# Patient Record
Sex: FEMALE | Race: WHITE | NOT HISPANIC OR LATINO | Employment: STUDENT | URBAN - METROPOLITAN AREA
[De-identification: names, ages, dates, MRNs, and addresses within clinical notes are randomized per-mention and may not be internally consistent; named-entity substitution may affect disease eponyms.]

---

## 2023-03-10 DIAGNOSIS — F41.9 ANXIETY: ICD-10-CM

## 2023-03-10 RX ORDER — MONTELUKAST SODIUM 10 MG/1
10 TABLET ORAL DAILY
COMMUNITY
Start: 2019-11-25 | End: 2023-05-24 | Stop reason: ALTCHOICE

## 2023-03-10 RX ORDER — MULTIVITAMIN
1 TABLET ORAL DAILY
COMMUNITY
Start: 2014-11-07

## 2023-03-10 RX ORDER — ESCITALOPRAM OXALATE 10 MG/1
10 TABLET ORAL DAILY
COMMUNITY
Start: 2020-11-17 | End: 2023-03-10 | Stop reason: SDUPTHER

## 2023-03-10 RX ORDER — ALBUTEROL SULFATE 90 UG/1
2 AEROSOL, METERED RESPIRATORY (INHALATION)
COMMUNITY
Start: 2017-11-27

## 2023-03-10 RX ORDER — ESCITALOPRAM OXALATE 10 MG/1
10 TABLET ORAL DAILY
Qty: 30 TABLET | Refills: 0 | Status: SHIPPED | OUTPATIENT
Start: 2023-03-10 | End: 2023-05-16 | Stop reason: SDUPTHER

## 2023-03-10 RX ORDER — FLUTICASONE PROPIONATE 50 MCG
1 SPRAY, SUSPENSION (ML) NASAL DAILY
COMMUNITY
Start: 2021-05-27

## 2023-03-10 RX ORDER — OLOPATADINE HYDROCHLORIDE 665 UG/1
2 SPRAY NASAL
COMMUNITY
Start: 2021-09-22

## 2023-05-15 ENCOUNTER — TELEPHONE (OUTPATIENT)
Dept: PEDIATRICS | Facility: CLINIC | Age: 18
End: 2023-05-15
Payer: COMMERCIAL

## 2023-05-15 DIAGNOSIS — F41.9 ANXIETY: ICD-10-CM

## 2023-05-16 RX ORDER — ESCITALOPRAM OXALATE 10 MG/1
10 TABLET ORAL DAILY
Qty: 30 TABLET | Refills: 0 | Status: SHIPPED | OUTPATIENT
Start: 2023-05-16 | End: 2023-05-24 | Stop reason: SDUPTHER

## 2023-05-23 ENCOUNTER — APPOINTMENT (OUTPATIENT)
Dept: PEDIATRICS | Facility: CLINIC | Age: 18
End: 2023-05-23
Payer: COMMERCIAL

## 2023-05-24 ENCOUNTER — OFFICE VISIT (OUTPATIENT)
Dept: PEDIATRICS | Facility: CLINIC | Age: 18
End: 2023-05-24
Payer: COMMERCIAL

## 2023-05-24 VITALS
WEIGHT: 138.5 LBS | DIASTOLIC BLOOD PRESSURE: 62 MMHG | BODY MASS INDEX: 23.07 KG/M2 | HEART RATE: 84 BPM | HEIGHT: 65 IN | SYSTOLIC BLOOD PRESSURE: 108 MMHG

## 2023-05-24 DIAGNOSIS — F41.9 ANXIETY: ICD-10-CM

## 2023-05-24 DIAGNOSIS — Z00.00 WELL ADULT EXAM: Primary | ICD-10-CM

## 2023-05-24 PROBLEM — R80.9 PROTEINURIA: Status: ACTIVE | Noted: 2023-05-24

## 2023-05-24 PROBLEM — M21.40 PES PLANUS: Status: ACTIVE | Noted: 2023-05-24

## 2023-05-24 PROBLEM — M24.80 GENERALIZED HYPERMOBILITY OF JOINTS: Status: ACTIVE | Noted: 2020-09-21

## 2023-05-24 PROBLEM — G47.9 RESTLESS SLEEPER: Status: ACTIVE | Noted: 2023-05-24

## 2023-05-24 PROBLEM — M25.50 ARTHRALGIA: Status: ACTIVE | Noted: 2023-05-24

## 2023-05-24 PROBLEM — M76.71 TENDINITIS OF RIGHT PERONEUS BREVIS TENDON: Status: ACTIVE | Noted: 2023-05-24

## 2023-05-24 PROBLEM — G47.00 INSOMNIA: Status: ACTIVE | Noted: 2023-05-24

## 2023-05-24 PROBLEM — U07.1 COVID-19 VIRUS INFECTION: Status: RESOLVED | Noted: 2023-05-24 | Resolved: 2023-05-24

## 2023-05-24 PROBLEM — M79.10 MYALGIA: Status: ACTIVE | Noted: 2023-05-24

## 2023-05-24 PROBLEM — M25.561 BILATERAL KNEE PAIN: Status: ACTIVE | Noted: 2020-09-21

## 2023-05-24 PROBLEM — J06.9 VIRAL URI WITH COUGH: Status: RESOLVED | Noted: 2023-05-24 | Resolved: 2023-05-24

## 2023-05-24 PROBLEM — E55.9 VITAMIN D INSUFFICIENCY: Status: ACTIVE | Noted: 2023-05-08

## 2023-05-24 PROBLEM — G47.9 SLEEP DISORDER: Status: ACTIVE | Noted: 2023-05-24

## 2023-05-24 PROBLEM — M25.562 BILATERAL KNEE PAIN: Status: ACTIVE | Noted: 2020-09-21

## 2023-05-24 PROBLEM — M22.2X1 PATELLOFEMORAL PAIN SYNDROME OF RIGHT KNEE: Status: ACTIVE | Noted: 2023-05-24

## 2023-05-24 PROBLEM — G47.8 SLEEP PARALYSIS: Status: ACTIVE | Noted: 2023-05-24

## 2023-05-24 PROBLEM — M62.89 HYPOTONIA: Status: ACTIVE | Noted: 2023-05-24

## 2023-05-24 PROBLEM — S06.0X9A CONCUSSION WITH LOSS OF CONSCIOUSNESS: Status: RESOLVED | Noted: 2023-05-24 | Resolved: 2023-05-24

## 2023-05-24 PROBLEM — R06.2 WHEEZING: Status: ACTIVE | Noted: 2023-05-24

## 2023-05-24 PROBLEM — L50.8 CHRONIC URTICARIA: Status: ACTIVE | Noted: 2019-09-11

## 2023-05-24 PROBLEM — J45.909 ASTHMA (HHS-HCC): Status: ACTIVE | Noted: 2023-05-24

## 2023-05-24 PROBLEM — J30.9 ALLERGIC RHINITIS: Status: ACTIVE | Noted: 2023-05-24

## 2023-05-24 PROBLEM — R21 MALAR RASH: Status: ACTIVE | Noted: 2023-05-24

## 2023-05-24 PROBLEM — R00.2 PALPITATIONS IN PEDIATRIC PATIENT: Status: ACTIVE | Noted: 2023-05-24

## 2023-05-24 PROBLEM — R76.8 ANTI-RNP ANTIBODIES PRESENT: Status: ACTIVE | Noted: 2018-09-19

## 2023-05-24 PROCEDURE — 96127 BRIEF EMOTIONAL/BEHAV ASSMT: CPT | Performed by: PEDIATRICS

## 2023-05-24 PROCEDURE — 1036F TOBACCO NON-USER: CPT | Performed by: PEDIATRICS

## 2023-05-24 PROCEDURE — 99395 PREV VISIT EST AGE 18-39: CPT | Performed by: PEDIATRICS

## 2023-05-24 RX ORDER — CETIRIZINE HYDROCHLORIDE 10 MG/1
10 TABLET ORAL
Qty: 30 TABLET | Refills: 5 | Status: SHIPPED | OUTPATIENT
Start: 2023-05-24 | End: 2023-06-23

## 2023-05-24 RX ORDER — ESCITALOPRAM OXALATE 10 MG/1
10 TABLET ORAL DAILY
Qty: 30 TABLET | Refills: 5 | Status: SHIPPED | OUTPATIENT
Start: 2023-05-24 | End: 2023-06-23

## 2023-05-24 RX ORDER — BECLOMETHASONE DIPROPIONATE HFA 80 UG/1
1 AEROSOL, METERED RESPIRATORY (INHALATION) 2 TIMES DAILY
COMMUNITY
Start: 2023-01-27

## 2023-05-24 RX ORDER — CETIRIZINE HYDROCHLORIDE 10 MG/1
10 TABLET ORAL
COMMUNITY
Start: 2022-06-15 | End: 2023-05-24 | Stop reason: SDUPTHER

## 2023-05-24 RX ORDER — HYDROXYCHLOROQUINE SULFATE 200 MG/1
100 TABLET, FILM COATED ORAL
COMMUNITY
Start: 2023-05-08
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
## 2024-01-31 ENCOUNTER — OFFICE VISIT (OUTPATIENT)
Dept: OBSTETRICS AND GYNECOLOGY | Facility: CLINIC | Age: 19
End: 2024-01-31
Payer: COMMERCIAL

## 2024-01-31 VITALS
SYSTOLIC BLOOD PRESSURE: 102 MMHG | HEIGHT: 64 IN | WEIGHT: 134 LBS | BODY MASS INDEX: 22.88 KG/M2 | DIASTOLIC BLOOD PRESSURE: 60 MMHG

## 2024-01-31 DIAGNOSIS — N64.4 BREAST PAIN: Primary | ICD-10-CM

## 2024-01-31 DIAGNOSIS — N60.19 FIBROCYSTIC BREAST CHANGES, UNSPECIFIED LATERALITY: ICD-10-CM

## 2024-01-31 PROCEDURE — 1036F TOBACCO NON-USER: CPT | Performed by: OBSTETRICS & GYNECOLOGY

## 2024-01-31 PROCEDURE — 99204 OFFICE O/P NEW MOD 45 MIN: CPT | Performed by: OBSTETRICS & GYNECOLOGY

## 2024-01-31 NOTE — PROGRESS NOTES
Alexander Galvan is a 18 y.o. female who presents with a chief complaint of Annual Exam (Patient never been sexually active. Does not want to be on birth control./) and Breast Problem (Complains she is having abnormal beast pain in both breast, more on the left side)      SUBJECTIVE  Patient presents complaining of breast pain.  She stated happens randomly and feels like a pressure on the lower and outer breast.  She has no history of breast cancer.  She denies any history of breast leakage.  She has regular periods.  She stated does not happen when she is moving or lifting.  She feels no lumps or masses    Past Medical History:   Diagnosis Date    Central auditory processing disorder     Auditory processing disorder    Concussion with loss of consciousness 05/24/2023    Contact with and (suspected) exposure to other viral communicable diseases 04/25/2022    Exposure to influenza    Contact with and (suspected) exposure to other viral communicable diseases 02/03/2020    Exposure to influenza    COVID-19 02/18/2021    COVID-19    Developmental disorder of scholastic skills, unspecified     Problems with learning    Other conditions influencing health status 12/07/2013    Disorders Of The Muscle, Ligament, And Fascia    Other lack of coordination     Dysgraphia    Other specified disorders of muscle     Hypertonia    Other speech disturbances     Verbal dyspraxia    Unspecified injury of right foot, initial encounter 12/22/2020    Injury of right foot, initial encounter    Unspecified lack of expected normal physiological development in childhood     Development delay    Viral URI with cough 05/24/2023     Past Surgical History:   Procedure Laterality Date    MYRINGOTOMY W/ TUBES  04/08/2015    Myringotomy - With Ventilating Tube Insertion    OTHER SURGICAL HISTORY  08/13/2014    Open Treament Of Periarticular Elbow Fracture     Social History     Socioeconomic History    Marital status: Single     Spouse name:  "None    Number of children: None    Years of education: None    Highest education level: None   Occupational History    None   Tobacco Use    Smoking status: Never     Passive exposure: Never    Smokeless tobacco: Never   Vaping Use    Vaping Use: Never used   Substance and Sexual Activity    Alcohol use: Not Currently    Drug use: Never    Sexual activity: Never   Other Topics Concern    None   Social History Narrative    None     Social Determinants of Health     Financial Resource Strain: Not on file   Food Insecurity: Not on file   Transportation Needs: Not on file   Physical Activity: Not on file   Stress: Not on file   Social Connections: Not on file   Intimate Partner Violence: Not on file   Housing Stability: Not on file     No family history on file.    OB History    Para Term  AB Living   0 0 0 0 0 0   SAB IAB Ectopic Multiple Live Births   0 0 0 0 0       OBJECTIVE  Allergies   Allergen Reactions    Guanfacine Unknown      (Not in a hospital admission)       Review of Systems  History obtained from the patient  General ROS: negative  Psychological ROS: negative  Gastrointestinal ROS: negative  Musculoskeletal ROS: negative  Physical Exam  General Appearance: awake, alert, oriented, in no acute distress, well developed, well nourished, and in no acute distress  Skin: there are no suspicious lesions or rashes of concern, skin color, texture, turgor are normal; there are no bruises, rashes or lesions.  Head/Face: NCAT  Eyes: No gross abnormalities., PERRL, and EOMI  Breast: BREAST EXAM: normal and fibrocystic changes  Abdomen: Soft, non-tender, normal bowel sounds; no bruits, organomegaly or masses.  Extremities: Extremities warm to touch, pink, with no edema.  Musculoskeletal: negative    /60   Ht 1.626 m (5' 4\")   Wt 60.8 kg (134 lb)   LMP 01/15/2024 (Exact Date)   BMI 23.00 kg/m²    Problem List Items Addressed This Visit    None  Visit Diagnoses       Breast pain    -  Primary    " Relevant Orders    BI US breast limited bilateral    Fibrocystic breast changes, unspecified laterality        Relevant Orders    BI US breast limited bilateral

## 2024-02-07 ENCOUNTER — HOSPITAL ENCOUNTER (OUTPATIENT)
Dept: RADIOLOGY | Facility: CLINIC | Age: 19
Discharge: HOME | End: 2024-02-07
Payer: COMMERCIAL

## 2024-02-07 DIAGNOSIS — N60.19 DIFFUSE CYSTIC MASTOPATHY OF UNSPECIFIED BREAST: ICD-10-CM

## 2024-02-07 DIAGNOSIS — N60.19 FIBROCYSTIC BREAST CHANGES, UNSPECIFIED LATERALITY: ICD-10-CM

## 2024-02-07 DIAGNOSIS — N64.4 BREAST PAIN: ICD-10-CM

## 2024-02-07 DIAGNOSIS — N64.4 MASTODYNIA: ICD-10-CM

## 2024-02-07 PROCEDURE — 76642 ULTRASOUND BREAST LIMITED: CPT | Mod: 50

## 2024-02-07 PROCEDURE — 76642 ULTRASOUND BREAST LIMITED: CPT | Mod: BILATERAL PROCEDURE | Performed by: RADIOLOGY

## 2024-04-10 ENCOUNTER — APPOINTMENT (OUTPATIENT)
Dept: OBSTETRICS AND GYNECOLOGY | Facility: CLINIC | Age: 19
End: 2024-04-10
Payer: COMMERCIAL